# Patient Record
Sex: FEMALE | Race: WHITE
[De-identification: names, ages, dates, MRNs, and addresses within clinical notes are randomized per-mention and may not be internally consistent; named-entity substitution may affect disease eponyms.]

---

## 2019-03-08 ENCOUNTER — HOSPITAL ENCOUNTER (OUTPATIENT)
Dept: HOSPITAL 91 - GIL | Age: 64
Discharge: HOME | End: 2019-03-08
Payer: COMMERCIAL

## 2019-03-08 ENCOUNTER — HOSPITAL ENCOUNTER (OUTPATIENT)
Dept: HOSPITAL 10 - GIL | Age: 64
Discharge: HOME | End: 2019-03-08
Attending: INTERNAL MEDICINE
Payer: MEDICAID

## 2019-03-08 VITALS
BODY MASS INDEX: 26.95 KG/M2 | HEIGHT: 63 IN | BODY MASS INDEX: 26.95 KG/M2 | HEIGHT: 63 IN | WEIGHT: 152.12 LBS | WEIGHT: 152.12 LBS

## 2019-03-08 VITALS — DIASTOLIC BLOOD PRESSURE: 60 MMHG | HEART RATE: 62 BPM | RESPIRATION RATE: 18 BRPM | SYSTOLIC BLOOD PRESSURE: 97 MMHG

## 2019-03-08 VITALS — RESPIRATION RATE: 18 BRPM | HEART RATE: 61 BPM | DIASTOLIC BLOOD PRESSURE: 81 MMHG | SYSTOLIC BLOOD PRESSURE: 118 MMHG

## 2019-03-08 DIAGNOSIS — K21.9: ICD-10-CM

## 2019-03-08 DIAGNOSIS — D12.5: ICD-10-CM

## 2019-03-08 DIAGNOSIS — K31.7: ICD-10-CM

## 2019-03-08 DIAGNOSIS — Z12.11: Primary | ICD-10-CM

## 2019-03-08 PROCEDURE — 88312 SPECIAL STAINS GROUP 1: CPT

## 2019-03-08 PROCEDURE — 88305 TISSUE EXAM BY PATHOLOGIST: CPT

## 2019-03-08 PROCEDURE — 45380 COLONOSCOPY AND BIOPSY: CPT

## 2019-03-08 PROCEDURE — 43239 EGD BIOPSY SINGLE/MULTIPLE: CPT

## 2022-12-13 ENCOUNTER — OFFICE (OUTPATIENT)
Dept: URBAN - METROPOLITAN AREA CLINIC 45 | Facility: CLINIC | Age: 67
End: 2022-12-13

## 2022-12-13 VITALS
DIASTOLIC BLOOD PRESSURE: 77 MMHG | TEMPERATURE: 98.1 F | WEIGHT: 169 LBS | SYSTOLIC BLOOD PRESSURE: 120 MMHG | HEART RATE: 81 BPM | HEIGHT: 63 IN

## 2022-12-13 DIAGNOSIS — K58.2 IRRITABLE BOWEL SYNDROME WITH ALTERNATING BOWEL HABITS: ICD-10-CM

## 2022-12-13 DIAGNOSIS — R14.1 BLOATING, GAS PAIN: ICD-10-CM

## 2022-12-13 PROCEDURE — 99204 OFFICE O/P NEW MOD 45 MIN: CPT | Performed by: INTERNAL MEDICINE

## 2022-12-13 NOTE — SERVICEHPINOTES
This was my first-time evaluation of this 67-year-old woman originally from [facility].  She reports she has a lot of GI issues including irritable bowel syndrome.  She has undergone an extensive evaluation of this year by Dr. Ron at [facility].  This includes upper endoscopy and colonoscopy as well as ultrasound.  I was not provided any of these studies for review.  She states she wants a second opinion because she "cannot understand him".  She has a lot of acid reflux currently treated with Pepcid 40 mg twice a day.  This seems to work when she takes it, but she is not always compliant.  She has very irregular bowel habits including diarrhea, loose stool, constipation and occasional accidents.  This has been going on for years.  There is no rectal bleeding and no weight loss.  She has been given dicyclomine which she uses intermittently.  Apparently, the family history listed below was incorrect she does not know of any family history of Crohn's disease. Currently her main complaints include chronic acid reflux and continuous bloating and distention.

## 2023-01-05 ENCOUNTER — OFFICE (OUTPATIENT)
Dept: URBAN - METROPOLITAN AREA CLINIC 45 | Facility: CLINIC | Age: 68
End: 2023-01-05

## 2023-01-05 VITALS — WEIGHT: 169 LBS | HEIGHT: 63 IN

## 2023-01-05 DIAGNOSIS — K58.2 IRRITABLE BOWEL SYNDROME WITH ALTERNATING BOWEL HABITS: ICD-10-CM

## 2023-01-05 DIAGNOSIS — R10.13 EPIGASTRIC PAIN: ICD-10-CM

## 2023-01-05 DIAGNOSIS — R14.1 BLOATING, GAS PAIN: ICD-10-CM

## 2023-01-05 PROCEDURE — 99214 OFFICE O/P EST MOD 30 MIN: CPT | Mod: 95 | Performed by: INTERNAL MEDICINE

## 2023-01-05 PROCEDURE — G0406 INPT/TELE FOLLOW UP 15: HCPCS | Performed by: INTERNAL MEDICINE

## 2023-01-05 RX ORDER — PANTOPRAZOLE SODIUM 40 MG/1
40 TABLET, DELAYED RELEASE ORAL
Qty: 30 | Status: ACTIVE
Start: 2023-01-05

## 2023-01-05 NOTE — SERVICEHPINOTES
The patient is seen today in follow-up for her multiple abdominal issues.  After her initial visit I received and went over in detail her endoscopic reports done by Dr. Ron in December 2021.  Colonoscopy to the cecum was normal with no evidence of polyps, colitis or diverticulitis.  Upper endoscopy revealed a hiatal hernia and signs of GERD as well as gastritis.  Biopsies for H. pylori were negative.  She has been on Pepcid which she has not felt to be that useful.  Recently started omeprazole 40 mg a day which gives her some better improvement.  She thinks she has had an ultrasound in the past several years but is uncertain.  She is having bouts of significant epigastric pain that radiated to the back.  She is also continuing to have bowel urgency and occasional accidents.  It does not appear that mucosal biopsies were done during her colonoscopy.  I had suggested a FODMAP diet that she is thus far found this too difficult to follow.

## 2023-01-18 LAB — US ABDOMEN COMPLETE: PDF REPORT: (no result)

## 2023-02-15 ENCOUNTER — OFFICE (OUTPATIENT)
Dept: URBAN - METROPOLITAN AREA CLINIC 45 | Facility: CLINIC | Age: 68
End: 2023-02-15

## 2023-02-15 VITALS
DIASTOLIC BLOOD PRESSURE: 81 MMHG | SYSTOLIC BLOOD PRESSURE: 130 MMHG | HEIGHT: 63 IN | TEMPERATURE: 98.2 F | HEART RATE: 78 BPM | WEIGHT: 167 LBS

## 2023-02-15 DIAGNOSIS — K75.81 NONALCOHOLIC STEATOHEPATITIS (NASH) - METABOLIC-ASSOCIATED STEATOHEPATITIS (MASH): ICD-10-CM

## 2023-02-15 DIAGNOSIS — D17.9 LIPOMA: ICD-10-CM

## 2023-02-15 DIAGNOSIS — K58.2 IRRITABLE BOWEL SYNDROME WITH ALTERNATING BOWEL HABITS: ICD-10-CM

## 2023-02-15 DIAGNOSIS — R14.1 BLOATING, GAS PAIN: ICD-10-CM

## 2023-02-15 PROCEDURE — 99214 OFFICE O/P EST MOD 30 MIN: CPT | Performed by: INTERNAL MEDICINE

## 2023-04-12 ENCOUNTER — OFFICE (OUTPATIENT)
Dept: URBAN - METROPOLITAN AREA CLINIC 45 | Facility: CLINIC | Age: 68
End: 2023-04-12

## 2023-04-12 VITALS — HEIGHT: 63 IN

## 2023-04-12 DIAGNOSIS — K76.0 FATTY (CHANGE OF) LIVER, NOT ELSEWHERE CLASSIFIED: ICD-10-CM

## 2023-04-12 PROCEDURE — 91200 LIVER ELASTOGRAPHY: CPT

## 2024-07-03 ENCOUNTER — OFFICE (OUTPATIENT)
Dept: URBAN - METROPOLITAN AREA CLINIC 45 | Facility: CLINIC | Age: 69
End: 2024-07-03

## 2024-07-03 VITALS — WEIGHT: 170 LBS | HEIGHT: 63 IN

## 2024-07-03 DIAGNOSIS — R14.1 BLOATING, GAS PAIN: ICD-10-CM

## 2024-07-03 DIAGNOSIS — K58.2 IRRITABLE BOWEL SYNDROME WITH BOTH CONSTIPATION AND DIARRHEA: ICD-10-CM

## 2024-07-03 PROCEDURE — 99214 OFFICE O/P EST MOD 30 MIN: CPT | Mod: 95 | Performed by: INTERNAL MEDICINE

## 2024-07-03 NOTE — SERVICENOTES
Patient is in [place] and  has provided verbal consent for telehealth visit, understands that their insurance will be billed for services provided today and that they may be responsible for payment of associated copayment and deductibles. Synchronous, audio, and visual communication was achieved for the visit.

## 2024-07-03 NOTE — SERVICEHPINOTES
The patient was last seen by me over a year ago.  We were doing an assessment for possible fatty liver.  I reviewed her fibro scan reports which were normal.  No evidence of fibrosis or excess fat content.  She contacted me today about worsening "IBS".  She has intermittent very bad diarrhea which prevents her from leaving the house.  She also has bouts of constipation, bloating and general discomfort.  I reviewed her most recent colonoscopy which was 3 years ago.  This was a normal study with no biopsies.  She does use magnesium supplements.  She is not on metformin.

## 2025-07-28 ENCOUNTER — OFFICE (OUTPATIENT)
Dept: URBAN - METROPOLITAN AREA CLINIC 45 | Facility: CLINIC | Age: 70
End: 2025-07-28

## 2025-07-28 VITALS
SYSTOLIC BLOOD PRESSURE: 127 MMHG | HEART RATE: 75 BPM | HEIGHT: 63 IN | DIASTOLIC BLOOD PRESSURE: 76 MMHG | WEIGHT: 169 LBS

## 2025-07-28 DIAGNOSIS — R14.1 BLOATING, GAS PAIN: ICD-10-CM

## 2025-07-28 DIAGNOSIS — K75.81 NONALCOHOLIC STEATOHEPATITIS (NASH) - METABOLIC-ASSOCIATED STEATOHEPATITIS (MASH): ICD-10-CM

## 2025-07-28 DIAGNOSIS — K58.2 IRRITABLE BOWEL SYNDROME WITH ALTERNATING BOWEL HABITS: ICD-10-CM

## 2025-07-28 PROCEDURE — 99214 OFFICE O/P EST MOD 30 MIN: CPT | Performed by: INTERNAL MEDICINE

## 2025-07-28 RX ORDER — LACTULOSE 10 G/15ML
SOLUTION ORAL
Qty: 30 | Status: ACTIVE
Start: 2025-07-28

## 2025-07-28 NOTE — SERVICEHPINOTES
The patient is seen for a chief complaint of rectal discharge.  She was last seen a year ago.  She reports she has irritable bowel syndrome, significant urgency and possible accidents.  Also some perianal itching and oozing.  She has been seen in an urgent care and was given Bactroban and clindamycin.  I reviewed her CT scan done earlier this month which showed no acute findings.  CBC and complete metabolic panel were normal.  She had a colonoscopy 4 years ago but no mucosal biopsies.